# Patient Record
Sex: FEMALE | Race: WHITE
[De-identification: names, ages, dates, MRNs, and addresses within clinical notes are randomized per-mention and may not be internally consistent; named-entity substitution may affect disease eponyms.]

---

## 2019-12-01 ENCOUNTER — HOSPITAL ENCOUNTER (EMERGENCY)
Dept: HOSPITAL 8 - ED | Age: 84
Discharge: HOME | End: 2019-12-01
Payer: MEDICARE

## 2019-12-01 VITALS — BODY MASS INDEX: 21.45 KG/M2 | HEIGHT: 64 IN | WEIGHT: 125.66 LBS

## 2019-12-01 VITALS — DIASTOLIC BLOOD PRESSURE: 60 MMHG | SYSTOLIC BLOOD PRESSURE: 157 MMHG

## 2019-12-01 DIAGNOSIS — Z85.3: ICD-10-CM

## 2019-12-01 DIAGNOSIS — Z90.710: ICD-10-CM

## 2019-12-01 DIAGNOSIS — E78.00: ICD-10-CM

## 2019-12-01 DIAGNOSIS — Z90.49: ICD-10-CM

## 2019-12-01 DIAGNOSIS — I10: ICD-10-CM

## 2019-12-01 DIAGNOSIS — R68.83: Primary | ICD-10-CM

## 2019-12-01 DIAGNOSIS — Z96.649: ICD-10-CM

## 2019-12-01 DIAGNOSIS — Z86.73: ICD-10-CM

## 2019-12-01 PROCEDURE — 93005 ELECTROCARDIOGRAM TRACING: CPT

## 2019-12-01 PROCEDURE — 99283 EMERGENCY DEPT VISIT LOW MDM: CPT

## 2019-12-01 NOTE — NUR
Patient given discharge instructions and they have confirmed that they 
understand the instructions.  Patient ambulatory with steady gait. Pt left with 
d/c paperwork and all personal belongings and a taxi voucher.

## 2019-12-01 NOTE — NUR
Received report from EAMON Gan. All questions answered. Assuming care of pt at 
this time. Pt sitting on gurney connected to cardiac monitor, NIBP cuff, and 
continous pulse ox monitor with call light within reach. Warm blanket provided. 
NADN. No other needs expressed.

## 2020-05-04 ENCOUNTER — HOSPITAL ENCOUNTER (OUTPATIENT)
Dept: HOSPITAL 8 - CFH | Age: 85
Discharge: HOME | End: 2020-05-04
Attending: INTERNAL MEDICINE
Payer: MEDICARE

## 2020-05-04 DIAGNOSIS — G45.9: ICD-10-CM

## 2020-05-04 DIAGNOSIS — Z85.3: ICD-10-CM

## 2020-05-04 DIAGNOSIS — I10: ICD-10-CM

## 2020-05-04 DIAGNOSIS — I08.3: Primary | ICD-10-CM

## 2020-05-04 DIAGNOSIS — E11.9: ICD-10-CM

## 2020-05-04 PROCEDURE — 93306 TTE W/DOPPLER COMPLETE: CPT
